# Patient Record
Sex: FEMALE | Race: WHITE | NOT HISPANIC OR LATINO
[De-identification: names, ages, dates, MRNs, and addresses within clinical notes are randomized per-mention and may not be internally consistent; named-entity substitution may affect disease eponyms.]

---

## 2023-01-24 PROBLEM — Z00.00 ENCOUNTER FOR PREVENTIVE HEALTH EXAMINATION: Status: ACTIVE | Noted: 2023-01-24

## 2023-01-25 ENCOUNTER — APPOINTMENT (OUTPATIENT)
Dept: ORTHOPEDIC SURGERY | Facility: CLINIC | Age: 23
End: 2023-01-25
Payer: COMMERCIAL

## 2023-01-25 PROCEDURE — 72170 X-RAY EXAM OF PELVIS: CPT

## 2023-01-25 PROCEDURE — 72110 X-RAY EXAM L-2 SPINE 4/>VWS: CPT

## 2023-01-25 PROCEDURE — 99204 OFFICE O/P NEW MOD 45 MIN: CPT

## 2023-01-25 RX ORDER — METHYLPREDNISOLONE 4 MG/1
4 TABLET ORAL
Qty: 1 | Refills: 1 | Status: ACTIVE | COMMUNITY
Start: 2023-01-25 | End: 1900-01-01

## 2023-01-25 RX ORDER — TRAZODONE HYDROCHLORIDE 50 MG/1
50 TABLET ORAL
Refills: 0 | Status: ACTIVE | COMMUNITY

## 2023-01-25 RX ORDER — BUPROPION HYDROCHLORIDE 150 MG/1
150 TABLET, EXTENDED RELEASE ORAL
Refills: 0 | Status: ACTIVE | COMMUNITY

## 2023-01-25 NOTE — HISTORY OF PRESENT ILLNESS
[Lower back] : lower back [Gradual] : gradual [Sudden] : sudden [6] : 6 [5] : 5 [Burning] : burning [Shooting] : shooting [Stabbing] : stabbing [Rest] : rest [Exercising] : exercising [Full time] : Work status: full time [de-identified] : 1/25/23: 23 yo RHD  f here today for the evaluation of her low back; pain to the left side of the back/buttock area with walking or lifting the leg. There is tingling into the leg.  right leg is okay -  No loss of b/b control. Reports at [employer] ([employer])  over the summer she injured the back, but this resolved spontaneously - went on for a couple of days and then it went away.   tHE PAIN got bad for her this weekend and she was basically stuck in bed with it\par \par She has been taking advil with some relief - reports trazodone helps her sleep\par No PT/chirocare/LOLIS/acupuncture/prior spine issue/mri \par \par otherwise healthy - takes oral contraceptives and herbal suppliments\par takes wellbutrin\par no surgeries\par No hx diabetes/cancer\par \par Works as a teacher\par \par xrays today\par L-spine 4V - NEGATIVE\par ap pelvis - negative  [] : Post Surgical Visit: no [FreeTextEntry1] : L spine  [FreeTextEntry5] : Pt has had a gradual onset of lower back pain for the past few days, pt has been having pain in the left side buttocks and shooting pain down into her left leg, pt has pain with sleeping as well as transferring motions from sit to stand  [FreeTextEntry7] : down into the left leg  [de-identified] : none  [de-identified] : Teacher

## 2023-01-25 NOTE — DISCUSSION/SUMMARY
[de-identified] : likely lumbar disc injury - lle radiculopathy \par indicated for mdp/mri l spine \par

## 2023-01-25 NOTE — PHYSICAL EXAM
[4___] : left quadriceps 4[unfilled]/5 [] : positive sitting straight leg raise [Left lower extremity below knee] : left lower extremity below knee [Left lower extremity above knee] : left lower extremity above knee

## 2023-02-10 ENCOUNTER — TRANSCRIPTION ENCOUNTER (OUTPATIENT)
Age: 23
End: 2023-02-10

## 2023-02-10 ENCOUNTER — APPOINTMENT (OUTPATIENT)
Dept: MRI IMAGING | Facility: CLINIC | Age: 23
End: 2023-02-10
Payer: COMMERCIAL

## 2023-02-10 ENCOUNTER — RESULT REVIEW (OUTPATIENT)
Age: 23
End: 2023-02-10

## 2023-02-10 PROCEDURE — 72148 MRI LUMBAR SPINE W/O DYE: CPT

## 2023-03-15 ENCOUNTER — APPOINTMENT (OUTPATIENT)
Dept: ORTHOPEDIC SURGERY | Facility: CLINIC | Age: 23
End: 2023-03-15
Payer: COMMERCIAL

## 2023-03-15 VITALS — WEIGHT: 170 LBS | BODY MASS INDEX: 27.32 KG/M2 | HEIGHT: 66 IN

## 2023-03-15 DIAGNOSIS — M51.26 OTHER INTERVERTEBRAL DISC DISPLACEMENT, LUMBAR REGION: ICD-10-CM

## 2023-03-15 DIAGNOSIS — M54.16 RADICULOPATHY, LUMBAR REGION: ICD-10-CM

## 2023-03-15 DIAGNOSIS — S32.009D UNSPECIFIED FRACTURE OF UNSPECIFIED LUMBAR VERTEBRA, SUBSEQUENT ENCOUNTER FOR FRACTURE WITH ROUTINE HEALING: ICD-10-CM

## 2023-03-15 DIAGNOSIS — M47.816 SPONDYLOSIS W/OUT MYELOPATHY OR RADICULOPATHY, LUMBAR REGION: ICD-10-CM

## 2023-03-15 PROCEDURE — 99214 OFFICE O/P EST MOD 30 MIN: CPT

## 2023-03-15 NOTE — PHYSICAL EXAM
[4___] : left quadriceps 4[unfilled]/5 [] : positive sitting straight leg raise [Left lower extremity above knee] : left lower extremity above knee [Left lower extremity below knee] : left lower extremity below knee

## 2023-03-15 NOTE — DISCUSSION/SUMMARY
[de-identified] : reviewed the [facility]i \par discussion of tx options \par rec she try to normalize her activity\par fu prn

## 2023-03-15 NOTE — HISTORY OF PRESENT ILLNESS
[Lower back] : lower back [Gradual] : gradual [1] : 2 [Sudden] : sudden [0] : 0 [Burning] : burning [Localized] : localized [Shooting] : shooting [Stabbing] : stabbing [Intermittent] : intermittent [Household chores] : household chores [Rest] : rest [Exercising] : exercising [Full time] : Work status: full time [de-identified] : 1/25/23: 21 yo RHD  f here today for the evaluation of her low back; pain to the left side of the back/buttock area with walking or lifting the leg. There is tingling into the leg.  right leg is okay -  No loss of b/b control. Reports at [facility] ([employer])  over the summer she injured the back, but this resolved spontaneously - went on for a couple of days and then it went away.   tHE PAIN got bad for her this weekend and she was basically stuck in bed with it\par \par She has been taking advil with some relief - reports trazodone helps her sleep\par No PT/chirocare/LOLIS/acupuncture/prior spine issue/mri \par \par otherwise healthy - takes oral contraceptives and herbal suppliments\par takes wellbutrin\par no surgeries\par No hx diabetes/cancer\par \par Works as a teacher\par \par xrays today\par L-spine 4V - NEGATIVE\par ap pelvis - negative \par \par 03/15/2023 here today to review mri results on the lower spine ,feeling  better since last visit - plan at lat was "likely lumbar disc injury - lle radiculopathy \par indicated for mdp/mri l spine" - overall doing better\par \par MRI L-spine 2/10/23\par Findings most consistent with nondisplaced fractures of the left L1-L4 transverse processes.\par No disc herniation, spinal canal or foraminal stenosis.\par  [] : Post Surgical Visit: no [FreeTextEntry1] : L spine  [FreeTextEntry5] : Pt has had a gradual onset of lower back pain for the past few days, pt has been having pain in the left side buttocks and shooting pain down into her left leg, pt has pain with sleeping as well as transferring motions from sit to stand  [FreeTextEntry7] : down into the left leg  [FreeTextEntry9] : medrol dose pack [de-identified] : x rays done at [facility],mri done [facility] [de-identified] : medrol dose pack [de-identified] : Teacher